# Patient Record
Sex: FEMALE | Race: WHITE
[De-identification: names, ages, dates, MRNs, and addresses within clinical notes are randomized per-mention and may not be internally consistent; named-entity substitution may affect disease eponyms.]

---

## 2021-06-21 ENCOUNTER — HOSPITAL ENCOUNTER (OUTPATIENT)
Dept: HOSPITAL 63 - US | Age: 21
End: 2021-06-21
Attending: INTERNAL MEDICINE
Payer: COMMERCIAL

## 2021-06-21 DIAGNOSIS — R10.13: Primary | ICD-10-CM

## 2021-06-21 DIAGNOSIS — R11.2: ICD-10-CM

## 2021-06-21 PROCEDURE — 76705 ECHO EXAM OF ABDOMEN: CPT

## 2021-06-21 NOTE — RAD
INDICATION : Reason: N/V; EPIGASTRIC PAIN AND TENDERNESS / Spl. Instructions:  / History: 



COMPARISON: None



TECHNIQUE: Multiple ultrasound images obtained through the abdomen in grayscale and color.



FINDINGS:



Liver: Echotexture within normal limits in visualized portions of liver.

Gallbladder: No wall thickening or stones.

IVC: Partially distended at level of liver.

Common Bile Duct: Not dilated.

Pancreas: No gross abnormality identified in visualized portions of pancreas.

Right Kidney:  No hydronephrosis.



IMPRESSION:



*   No biliary ductal dilation or gallstones.



Electronically signed by: Sami Rodriguez MD (6/21/2021 10:00 AM) [id number]